# Patient Record
Sex: FEMALE | Race: BLACK OR AFRICAN AMERICAN | NOT HISPANIC OR LATINO
[De-identification: names, ages, dates, MRNs, and addresses within clinical notes are randomized per-mention and may not be internally consistent; named-entity substitution may affect disease eponyms.]

---

## 2017-05-04 ENCOUNTER — TRANSCRIPTION ENCOUNTER (OUTPATIENT)
Age: 42
End: 2017-05-04

## 2023-06-12 PROBLEM — Z00.00 ENCOUNTER FOR PREVENTIVE HEALTH EXAMINATION: Status: ACTIVE | Noted: 2023-06-12

## 2023-06-13 ENCOUNTER — APPOINTMENT (OUTPATIENT)
Dept: ORTHOPEDIC SURGERY | Facility: CLINIC | Age: 48
End: 2023-06-13
Payer: COMMERCIAL

## 2023-06-13 VITALS — WEIGHT: 170 LBS | BODY MASS INDEX: 30.12 KG/M2 | HEIGHT: 63 IN

## 2023-06-13 DIAGNOSIS — Z78.9 OTHER SPECIFIED HEALTH STATUS: ICD-10-CM

## 2023-06-13 PROCEDURE — 99204 OFFICE O/P NEW MOD 45 MIN: CPT
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
## 2023-06-13 NOTE — ASSESSMENT
[FreeTextEntry1] : 47 year F with left knee post trauamtic arthritis from tibila plateau fracture\par - physical therapy, NSAIDs\par - Return in 6 weeks for follow up

## 2023-06-13 NOTE — IMAGING
[de-identified] : LEFT KNEE EXAM\par Alignment: Valgus\par Effusion: None\par Atrophy: None                                                 \par Stable to Varus/valgus stress\par Posterior Drawer Test: negative\par Anterior Drawer Test: Negative\par Knee Extension/Flexion: 0 / 125\par \par Medial/lateral compartments\par Medial joint line: + Tenderness\par Lateral joint line: + Tenderness\par Marisa test: negative\par \par Patellofemoral joint\par Medial patellar facet: no tenderness\par Patellar grind: Negative\par \par Tendons:\par Pes Anserine: No tenderness\par Gerdy’s Tubercle/ IT Band: No tenderness\par Quadriceps Tendon: No Tenderness\par patellar tendon: no Tenderness\par Tibial tubercle: not tenderness\par Calf: no Tenderness\par \par Neurovascular exam\par Muscle strength: 5/5\par Sensation to light touch: intact\par Distal pulses: 2+\par \par IMAGING:\par 06/13/2023 Xrays of the Left Knee were taken demonstrating: mild narrowing at MJL. Evidence of healed tibial plateau fx.

## 2023-07-26 ENCOUNTER — APPOINTMENT (OUTPATIENT)
Dept: ORTHOPEDIC SURGERY | Facility: CLINIC | Age: 48
End: 2023-07-26
Payer: COMMERCIAL

## 2023-07-26 VITALS — WEIGHT: 170 LBS | HEIGHT: 63 IN | BODY MASS INDEX: 30.12 KG/M2

## 2023-07-26 PROCEDURE — 99214 OFFICE O/P EST MOD 30 MIN: CPT

## 2023-07-26 RX ORDER — CYCLOBENZAPRINE HYDROCHLORIDE 10 MG/1
10 TABLET, FILM COATED ORAL
Qty: 30 | Refills: 0 | Status: ACTIVE | COMMUNITY
Start: 2023-07-26 | End: 1900-01-01
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
## 2023-07-26 NOTE — IMAGING
[de-identified] : LEFT KNEE EXAM\par Alignment: Valgus\par Effusion: None\par Atrophy: None                                                 \par Stable to Varus/valgus stress\par Posterior Drawer Test: negative\par Anterior Drawer Test: Negative\par Knee Extension/Flexion: 0 / 125\par \par Medial/lateral compartments\par Medial joint line: + Tenderness\par Lateral joint line: + Tenderness\par Marisa test: negative\par \par Patellofemoral joint\par Medial patellar facet: no tenderness\par Patellar grind: Negative\par \par Tendons:\par Pes Anserine: No tenderness\par Gerdy’s Tubercle/ IT Band: No tenderness\par Quadriceps Tendon: No Tenderness\par patellar tendon: no Tenderness\par Tibial tubercle: not tenderness\par Calf: no Tenderness\par \par Neurovascular exam\par Muscle strength: 5/5\par Sensation to light touch: intact\par Distal pulses: 2+\par \par IMAGING:\par 06/13/2023 Xrays of the Left Knee were taken demonstrating: mild narrowing at MJL. Evidence of healed tibial plateau fx.

## 2023-07-26 NOTE — ASSESSMENT
[FreeTextEntry1] : 47 year F with left knee post trauamtic arthritis from tibila plateau fracture\par - physical therapy, NSAIDs\par - Return in 6 weeks for follow up \par \par 07/26/2023 meloxicam didn’t, naproxen has helped in the past\par naproxen + flexeril\par PRN fu

## 2023-09-27 ENCOUNTER — APPOINTMENT (OUTPATIENT)
Dept: ORTHOPEDIC SURGERY | Facility: CLINIC | Age: 48
End: 2023-09-27
Payer: COMMERCIAL

## 2023-09-27 PROCEDURE — 99214 OFFICE O/P EST MOD 30 MIN: CPT

## 2023-10-11 ENCOUNTER — APPOINTMENT (OUTPATIENT)
Dept: MRI IMAGING | Facility: CLINIC | Age: 48
End: 2023-10-11
Payer: COMMERCIAL

## 2023-10-11 PROCEDURE — 73721 MRI JNT OF LWR EXTRE W/O DYE: CPT | Mod: LT

## 2023-10-25 ENCOUNTER — APPOINTMENT (OUTPATIENT)
Dept: ORTHOPEDIC SURGERY | Facility: CLINIC | Age: 48
End: 2023-10-25
Payer: COMMERCIAL

## 2023-10-25 PROCEDURE — 99214 OFFICE O/P EST MOD 30 MIN: CPT

## 2023-11-06 ENCOUNTER — APPOINTMENT (OUTPATIENT)
Dept: ORTHOPEDIC SURGERY | Facility: CLINIC | Age: 48
End: 2023-11-06

## 2023-11-08 ENCOUNTER — APPOINTMENT (OUTPATIENT)
Dept: ORTHOPEDIC SURGERY | Facility: CLINIC | Age: 48
End: 2023-11-08

## 2023-11-30 ENCOUNTER — APPOINTMENT (OUTPATIENT)
Dept: ORTHOPEDIC SURGERY | Facility: CLINIC | Age: 48
End: 2023-11-30

## 2024-01-15 ENCOUNTER — APPOINTMENT (OUTPATIENT)
Dept: PAIN MANAGEMENT | Facility: CLINIC | Age: 49
End: 2024-01-15
Payer: COMMERCIAL

## 2024-01-15 VITALS — HEIGHT: 63 IN | WEIGHT: 170 LBS | BODY MASS INDEX: 30.12 KG/M2

## 2024-01-15 DIAGNOSIS — Z82.49 FAMILY HISTORY OF ISCHEMIC HEART DISEASE AND OTHER DISEASES OF THE CIRCULATORY SYSTEM: ICD-10-CM

## 2024-01-15 PROCEDURE — 99204 OFFICE O/P NEW MOD 45 MIN: CPT

## 2024-01-15 RX ORDER — NAPROXEN 500 MG/1
500 TABLET ORAL
Qty: 28 | Refills: 2 | Status: DISCONTINUED | COMMUNITY
Start: 2023-07-26 | End: 2024-01-15

## 2024-01-15 RX ORDER — DICLOFENAC SODIUM 3 G/100G
3 GEL TOPICAL TWICE DAILY
Qty: 1 | Refills: 0 | Status: ACTIVE | COMMUNITY
Start: 2024-01-15 | End: 1900-01-01

## 2024-01-15 RX ORDER — MELOXICAM 15 MG/1
15 TABLET ORAL
Qty: 30 | Refills: 0 | Status: DISCONTINUED | COMMUNITY
Start: 2023-06-13 | End: 2024-01-15

## 2024-01-15 RX ORDER — DICLOFENAC SODIUM 75 MG/1
75 TABLET, DELAYED RELEASE ORAL TWICE DAILY
Qty: 30 | Refills: 0 | Status: DISCONTINUED | COMMUNITY
Start: 2023-10-25 | End: 2024-01-15

## 2024-01-15 NOTE — DISCUSSION/SUMMARY
[de-identified] : JAMES YUEN is a 48 year-old woman presenting for a NPV for a history of chronic left knee pain.   Prior treatment: Diclofenac Meloxicam Naproxen Cyclobenzaprine Physical therapy  Plan: 1) MRI left knee reviewed. 2) Trial diclofenac 3% topical gel. Discussed the risks of NSAIDs, including worsening HTN, cardiovascular risks, GI risk, renal injury. The patient was told not to take other NSAIDs with this and to consult with their PCP if there is a significant medical history to warrant this prior to initiating treatment. 3) IF ineffective, may prescribe celecoxib 200mg BID prn 4) Trial gabapentin 300mg with increase to TID; Discussed AEs, including sedation, dizziness, somnolence, depression. Patient told to use caution when driving after taking the first few doses. 5) Consider repeat CSI of the LEFT knee 6) Continue home exercise regimen 7) RTC 6 weeks

## 2024-01-15 NOTE — DATA REVIEWED
[MRI] : MRI [Left] : left [Knee] : knee [Report was reviewed and noted in the chart] : The report was reviewed and noted in the chart [FreeTextEntry1] : 10/11/2023: osteochondral defect in femoral condyle mild chronic ACL sprain slight patellofemoral effusion

## 2024-01-15 NOTE — HISTORY OF PRESENT ILLNESS
[Left Leg] : left leg [Sudden] : sudden [8] : 8 [Burning] : burning [Sharp] : sharp [Constant] : constant [Household chores] : household chores [Leisure] : leisure [Rest] : rest [Standing] : standing [Walking] : walking [] : no [Full time] : Work status: full time [FreeTextEntry1] : Lt knee

## 2024-01-15 NOTE — PHYSICAL EXAM
[de-identified] : Gen: NAD HEENT: NC/AT, no scleral icterus; patient wears glasses CV: no JVD Resp: nonlabored breathing Abd: nondistended Ext: LLE: +TTP over the inferior aspect of the patella and lateral aspect of knee, full flexion/extension of the leg, minimal edema over the distal knee Neuro: CN intact Psych: normal affect

## 2024-01-31 ENCOUNTER — APPOINTMENT (OUTPATIENT)
Dept: ORTHOPEDIC SURGERY | Facility: CLINIC | Age: 49
End: 2024-01-31

## 2024-02-01 ENCOUNTER — APPOINTMENT (OUTPATIENT)
Dept: ORTHOPEDIC SURGERY | Facility: CLINIC | Age: 49
End: 2024-02-01

## 2024-02-06 ENCOUNTER — APPOINTMENT (OUTPATIENT)
Dept: ORTHOPEDIC SURGERY | Facility: CLINIC | Age: 49
End: 2024-02-06
Payer: COMMERCIAL

## 2024-02-06 VITALS — WEIGHT: 170 LBS | BODY MASS INDEX: 30.12 KG/M2 | HEIGHT: 63 IN

## 2024-02-06 PROCEDURE — 99215 OFFICE O/P EST HI 40 MIN: CPT

## 2024-02-06 RX ORDER — NAPROXEN 500 MG/1
500 TABLET ORAL
Qty: 60 | Refills: 0 | Status: ACTIVE | COMMUNITY
Start: 2024-02-06 | End: 1900-01-01

## 2024-02-06 RX ORDER — METHYLPREDNISOLONE 4 MG/1
4 TABLET ORAL
Qty: 1 | Refills: 1 | Status: ACTIVE | COMMUNITY
Start: 2024-02-06 | End: 1900-01-01

## 2024-02-06 NOTE — ASSESSMENT
[FreeTextEntry1] : 47 year F with left knee post trauamtic arthritis from tibila plateau fracture - physical therapy, NSAIDs - Return in 6 weeks for follow up   2023 meloxicam didn't, naproxen has helped in the past naproxen + flexeril PRN fu  2023 MRI of the left knee, failed PT, prior hx of tibial plateau fx naproxen and fu after completion continue PT fu with PM  24: discussed MRI in detail with patient OCD defect on the medial femora condyle  rec: fu with Dr. Ernst to determine if bone grafting is an option, if not, then will consider CSI vs PKA MDP and naproxen prescribed  Time Based billin minutes was spent with the patient today taking the patient's history, conducting a physical examination, reviewing imaging studies, and  detailed discussion regarding the diagnosis and treatment plan.

## 2024-02-06 NOTE — IMAGING
[de-identified] : LEFT KNEE EXAM Alignment: Valgus Effusion: None Atrophy: None                                                  Stable to Varus/valgus stress Posterior Drawer Test: negative Anterior Drawer Test: Negative Knee Extension/Flexion: 0 / 125  Medial/lateral compartments Medial joint line: + Tenderness Lateral joint line: + Tenderness Marisa test: negative  Patellofemoral joint Medial patellar facet: no tenderness Patellar grind: Negative  Tendons: Pes Anserine: No tenderness Gerdy's Tubercle/ IT Band: No tenderness Quadriceps Tendon: No Tenderness patellar tendon: no Tenderness Tibial tubercle: not tenderness Calf: no Tenderness  Neurovascular exam Muscle strength: 5/5 Sensation to light touch: intact Distal pulses: 2+  [id number] Xrays of the Left Knee were taken demonstrating: mild narrowing at MJL. Evidence of healed tibial plateau fx.
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
## 2024-02-08 PROBLEM — M17.12 ARTHRITIS OF KNEE, LEFT: Status: RESOLVED | Noted: 2023-06-13 | Resolved: 2024-02-08

## 2024-02-08 PROBLEM — M95.8 OSTEOCHONDRAL DEFECT OF CONDYLE OF FEMUR: Status: RESOLVED | Noted: 2023-10-25 | Resolved: 2024-02-08

## 2024-02-12 ENCOUNTER — APPOINTMENT (OUTPATIENT)
Dept: ORTHOPEDIC SURGERY | Facility: CLINIC | Age: 49
End: 2024-02-12

## 2024-02-12 DIAGNOSIS — M95.8 OTHER SPECIFIED ACQUIRED DEFORMITIES OF MUSCULOSKELETAL SYSTEM: ICD-10-CM

## 2024-02-12 DIAGNOSIS — M17.12 UNILATERAL PRIMARY OSTEOARTHRITIS, LEFT KNEE: ICD-10-CM

## 2024-02-15 NOTE — HISTORY OF PRESENT ILLNESS
[de-identified] : 48 year old female  (  )   chronic left knee pain for years, worsenign since May 2023 when twisted getting up from couch.  Has seen Dr. Bar and Carlos but cont to have symptoms The pain is located  medial and deep The pain is associated with  swelling, catching, buckling Worse with activity and better at rest. Has tried ice, nsaids, activity mod, PT, HEP

## 2024-02-15 NOTE — IMAGING
[de-identified] :  LEFT KNEE Inspection:  mild effusion Palpation: medial femoral condyle tenderness  Knee Range of Motion:  0-125  Strength: 5/5 Quadriceps strength, 5/5 Hamstring strength Neurological: light touch is intact throughout Ligament Stability and Special Tests:  McMurrays: Positive Lachman: neg Pivot Shift: neg Posterior Drawer: neg Valgus: neg Varus: neg Patella Apprehension: neg Patella Maltracking: neg

## 2024-02-15 NOTE — ASSESSMENT
[FreeTextEntry1] : notes from dr. kelly and neville reviewed Imaging was reviewed and independently interpreted xrays - no sig deg changes mri left knee 10/11/23 - focal chondral defect of the MFC 1.4acm AP X 1cm ML, eff, synv  - We discussed their diagnosis and treatment options at length including the risks and benefits of both surgical and non-surgical options. - Given their active lifestyle along with continued pain and mechanical symptoms despite conservative treatment they are a surgical candidate. - The risks, benefits, and alternatives to left knee open Agili-C implantation vs chondroplasty and cartilage biopsy (for poss subq ACI) were discussed with the patient, all questions were answered.

## 2024-02-26 ENCOUNTER — APPOINTMENT (OUTPATIENT)
Dept: PAIN MANAGEMENT | Facility: CLINIC | Age: 49
End: 2024-02-26

## 2024-02-26 NOTE — HISTORY OF PRESENT ILLNESS
[Left Leg] : left leg [Sudden] : sudden [8] : 8 [Burning] : burning [Sharp] : sharp [Constant] : constant [Household chores] : household chores [Leisure] : leisure [Rest] : rest [Standing] : standing [Walking] : walking [Full time] : Work status: full time [] : no [FreeTextEntry1] : Lt knee

## 2024-02-26 NOTE — PHYSICAL EXAM
[de-identified] : Gen: NAD HEENT: NC/AT, no scleral icterus; patient wears glasses CV: no JVD Resp: nonlabored breathing Abd: nondistended Ext: LLE: +TTP over the inferior aspect of the patella and lateral aspect of knee, full flexion/extension of the leg, minimal edema over the distal knee Neuro: CN intact Psych: normal affect

## 2024-02-26 NOTE — DISCUSSION/SUMMARY
[de-identified] : JAMES YUEN is a 48 year-old woman presenting for a NPV for a history of chronic left knee pain.   Prior treatment: Diclofenac Meloxicam Naproxen Cyclobenzaprine Physical therapy  Plan: 1) MRI left knee reviewed. 2) Trial diclofenac 3% topical gel. Discussed the risks of NSAIDs, including worsening HTN, cardiovascular risks, GI risk, renal injury. The patient was told not to take other NSAIDs with this and to consult with their PCP if there is a significant medical history to warrant this prior to initiating treatment. 3) IF ineffective, may prescribe celecoxib 200mg BID prn 4) Trial gabapentin 300mg with increase to TID; Discussed AEs, including sedation, dizziness, somnolence, depression. Patient told to use caution when driving after taking the first few doses. 5) Consider repeat CSI of the LEFT knee 6) Continue home exercise regimen 7) RTC 6 weeks

## 2024-03-27 ENCOUNTER — RX RENEWAL (OUTPATIENT)
Age: 49
End: 2024-03-27

## 2024-03-27 RX ORDER — GABAPENTIN 300 MG/1
300 CAPSULE ORAL
Qty: 90 | Refills: 0 | Status: ACTIVE | COMMUNITY
Start: 2024-01-15 | End: 1900-01-01

## 2024-04-08 ENCOUNTER — APPOINTMENT (OUTPATIENT)
Dept: PAIN MANAGEMENT | Facility: CLINIC | Age: 49
End: 2024-04-08
Payer: COMMERCIAL

## 2024-04-08 VITALS — HEIGHT: 63 IN | BODY MASS INDEX: 33.49 KG/M2 | WEIGHT: 189 LBS

## 2024-04-08 DIAGNOSIS — G47.00 INSOMNIA, UNSPECIFIED: ICD-10-CM

## 2024-04-08 PROCEDURE — 99214 OFFICE O/P EST MOD 30 MIN: CPT

## 2024-04-08 RX ORDER — GABAPENTIN 600 MG/1
600 TABLET, COATED ORAL 3 TIMES DAILY
Qty: 90 | Refills: 1 | Status: ACTIVE | COMMUNITY
Start: 2024-04-08 | End: 1900-01-01

## 2024-04-08 RX ORDER — TRAZODONE HYDROCHLORIDE 50 MG/1
50 TABLET ORAL
Qty: 30 | Refills: 1 | Status: ACTIVE | COMMUNITY
Start: 2024-04-08 | End: 1900-01-01

## 2024-04-08 NOTE — HISTORY OF PRESENT ILLNESS
[Left Leg] : left leg [Sudden] : sudden [8] : 8 [Burning] : burning [Sharp] : sharp [Constant] : constant [Household chores] : household chores [Leisure] : leisure [Rest] : rest [Standing] : standing [Walking] : walking [Full time] : Work status: full time [5] : 5 [Dull/Aching] : dull/aching [Throbbing] : throbbing [Sleep] : sleep [Ice] : ice [Heat] : heat [Physical therapy] : physical therapy [Nothing helps with pain getting better] : Nothing helps with pain getting better [Stairs] : stairs [] : no [FreeTextEntry1] : Lt knee [de-identified] : twisting  [de-identified] : x-ray and MRI

## 2024-04-08 NOTE — DISCUSSION/SUMMARY
[de-identified] : JAMES YUEN is a 48 year-old woman presenting for a RPV for a history of chronic left knee pain.   Prior treatment: Diclofenac Meloxicam Celecoxib Naproxen Cyclobenzaprine Physical therapy  Plan: 1) MRI left knee report reviewed with the patient. 2) Continue diclofenac 1% topical gel.  3) Increase gabapentin to 600mg TID; Discussed AEs, including sedation, dizziness, somnolence, depression. Patient told to use caution when driving after taking the first few doses. 4) Trial trazodone 50mg qhs; Discussed AEs, including sedation, dizziness, somnolence. Patient told to use caution when driving after taking the first few doses. 5) Consider repeat CSI of the LEFT knee 6) Continue home exercise regimen 7) Discussed with the patient that we do not recommend chronic opioid therapy for her chronic knee pain 8) RTC 6 weeks

## 2024-04-08 NOTE — PHYSICAL EXAM
[de-identified] : Gen: NAD HEENT: NC/AT, no scleral icterus; patient wears glasses CV: no JVD Resp: nonlabored breathing Abd: nondistended Ext: LLE: +TTP over the inferior aspect of the patella and lateral aspect of knee, full flexion/extension of the leg, minimal edema over the distal knee Neuro: CN intact Psych: normal affect

## 2024-05-16 PROBLEM — M65.9 SYNOVITIS OF KNEE: Status: ACTIVE | Noted: 2024-02-08

## 2024-05-16 PROBLEM — M25.562 CHRONIC PAIN OF LEFT KNEE: Status: ACTIVE | Noted: 2024-01-15

## 2024-05-16 PROBLEM — M23.8X2 CHONDRAL DEFECT OF CONDYLE OF LEFT FEMUR: Status: ACTIVE | Noted: 2024-02-08

## 2024-05-20 ENCOUNTER — APPOINTMENT (OUTPATIENT)
Dept: PAIN MANAGEMENT | Facility: CLINIC | Age: 49
End: 2024-05-20

## 2024-05-20 DIAGNOSIS — M65.9 SYNOVITIS AND TENOSYNOVITIS, UNSPECIFIED: ICD-10-CM

## 2024-05-20 DIAGNOSIS — G89.29 PAIN IN LEFT KNEE: ICD-10-CM

## 2024-05-20 DIAGNOSIS — M25.562 PAIN IN LEFT KNEE: ICD-10-CM

## 2024-05-20 DIAGNOSIS — M23.8X2 OTHER INTERNAL DERANGEMENTS OF LEFT KNEE: ICD-10-CM

## 2024-05-21 NOTE — PHYSICAL EXAM
[de-identified] : Gen: NAD HEENT: NC/AT, no scleral icterus; patient wears glasses CV: no JVD Resp: nonlabored breathing Abd: nondistended Ext: LLE: +TTP over the inferior aspect of the patella and lateral aspect of knee, full flexion/extension of the leg, minimal edema over the distal knee Neuro: CN intact Psych: normal affect

## 2024-05-21 NOTE — HISTORY OF PRESENT ILLNESS
[Left Leg] : left leg [Sudden] : sudden [5] : 5 [8] : 8 [Dull/Aching] : dull/aching [Sharp] : sharp [Throbbing] : throbbing [Constant] : constant [Household chores] : household chores [Leisure] : leisure [Sleep] : sleep [Rest] : rest [Ice] : ice [Heat] : heat [Physical therapy] : physical therapy [Nothing helps with pain getting better] : Nothing helps with pain getting better [Standing] : standing [Walking] : walking [Stairs] : stairs [Full time] : Work status: full time [] : no [FreeTextEntry1] : Lt knee [de-identified] : twisting  [de-identified] : x-ray and MRI

## 2024-05-21 NOTE — DISCUSSION/SUMMARY
[de-identified] : JAMES YUEN is a 48 year-old woman presenting for a RPV for a history of chronic left knee pain.   Prior treatment: Diclofenac Meloxicam Celecoxib Naproxen Cyclobenzaprine Physical therapy  Plan: 1) MRI left knee report reviewed with the patient. 2) Continue diclofenac 1% topical gel.  3) Increase gabapentin to 600mg TID; Discussed AEs, including sedation, dizziness, somnolence, depression. Patient told to use caution when driving after taking the first few doses. 4) Trial trazodone 50mg qhs; Discussed AEs, including sedation, dizziness, somnolence. Patient told to use caution when driving after taking the first few doses. 5) Consider repeat CSI of the LEFT knee 6) Continue home exercise regimen 7) Discussed with the patient that we do not recommend chronic opioid therapy for her chronic knee pain 8) RTC 6 weeks

## 2024-07-29 ENCOUNTER — APPOINTMENT (OUTPATIENT)
Dept: PAIN MANAGEMENT | Facility: CLINIC | Age: 49
End: 2024-07-29
Payer: COMMERCIAL

## 2024-07-29 VITALS — HEIGHT: 63 IN | WEIGHT: 186 LBS | BODY MASS INDEX: 32.96 KG/M2

## 2024-07-29 DIAGNOSIS — M25.562 PAIN IN LEFT KNEE: ICD-10-CM

## 2024-07-29 DIAGNOSIS — M65.9 SYNOVITIS AND TENOSYNOVITIS, UNSPECIFIED: ICD-10-CM

## 2024-07-29 DIAGNOSIS — G47.00 INSOMNIA, UNSPECIFIED: ICD-10-CM

## 2024-07-29 DIAGNOSIS — M54.50 LOW BACK PAIN, UNSPECIFIED: ICD-10-CM

## 2024-07-29 DIAGNOSIS — G89.29 PAIN IN LEFT KNEE: ICD-10-CM

## 2024-07-29 DIAGNOSIS — G89.29 LOW BACK PAIN, UNSPECIFIED: ICD-10-CM

## 2024-07-29 PROCEDURE — 99214 OFFICE O/P EST MOD 30 MIN: CPT

## 2024-07-29 RX ORDER — ZOLPIDEM TARTRATE 10 MG/1
10 TABLET ORAL
Qty: 30 | Refills: 0 | Status: ACTIVE | COMMUNITY
Start: 2024-07-29 | End: 1900-01-01

## 2024-07-29 NOTE — HISTORY OF PRESENT ILLNESS
[Left Leg] : left leg [Sudden] : sudden [5] : 5 [8] : 8 [Dull/Aching] : dull/aching [Sharp] : sharp [Throbbing] : throbbing [Constant] : constant [Household chores] : household chores [Leisure] : leisure [Sleep] : sleep [Rest] : rest [Ice] : ice [Heat] : heat [Physical therapy] : physical therapy [Nothing helps with pain getting better] : Nothing helps with pain getting better [Standing] : standing [Walking] : walking [Stairs] : stairs [Full time] : Work status: full time [7] : 7 [] : no [FreeTextEntry1] : Lt knee [de-identified] : twisting, cold weather [de-identified] : x-ray and MRI

## 2024-07-29 NOTE — HISTORY OF PRESENT ILLNESS
[Left Leg] : left leg [Sudden] : sudden [5] : 5 [8] : 8 [Dull/Aching] : dull/aching [Sharp] : sharp [Throbbing] : throbbing [Constant] : constant [Household chores] : household chores [Leisure] : leisure [Sleep] : sleep [Rest] : rest [Ice] : ice [Heat] : heat [Physical therapy] : physical therapy [Nothing helps with pain getting better] : Nothing helps with pain getting better [Standing] : standing [Walking] : walking [Stairs] : stairs [Full time] : Work status: full time [7] : 7 [] : no [FreeTextEntry1] : Lt knee [de-identified] : twisting, cold weather [de-identified] : x-ray and MRI

## 2024-07-29 NOTE — DISCUSSION/SUMMARY
[de-identified] : JAMES YUEN is a 48 year-old woman presenting for a RPV for a history of chronic left knee pain.   Prior treatment: Diclofenac Meloxicam Celecoxib Naproxen Cyclobenzaprine Trazodone Physical therapy  Plan: 1) MRI left knee report reviewed with the patient. 2) Continue diclofenac 1% topical gel.  3) Refill gabapentin to 600mg TID; denies AEs 4) ONE TIME prescription for zolpidem 10mg #30 tabs 5) Consider repeat CSI of the LEFT knee 6) Continue home exercise regimen 7) Discussed with the patient that we do not recommend chronic opioid therapy for her chronic knee pain 8) RTC prn

## 2024-07-29 NOTE — PHYSICAL EXAM
[de-identified] : Gen: NAD HEENT: NC/AT, no scleral icterus; patient wears glasses CV: no JVD Resp: nonlabored breathing Abd: nondistended Ext: LLE: sleeve over the knee; +TTP over the inferior aspect of the patella and lateral aspect of knee, full flexion/extension of the leg, no edema Neuro: CN intact Psych: normal affect

## 2024-07-29 NOTE — PHYSICAL EXAM
[de-identified] : Gen: NAD HEENT: NC/AT, no scleral icterus; patient wears glasses CV: no JVD Resp: nonlabored breathing Abd: nondistended Ext: LLE: sleeve over the knee; +TTP over the inferior aspect of the patella and lateral aspect of knee, full flexion/extension of the leg, no edema Neuro: CN intact Psych: normal affect

## 2024-07-29 NOTE — DISCUSSION/SUMMARY
[de-identified] : JAMES UYEN is a 48 year-old woman presenting for a RPV for a history of chronic left knee pain.   Prior treatment: Diclofenac Meloxicam Celecoxib Naproxen Cyclobenzaprine Trazodone Physical therapy  Plan: 1) MRI left knee report reviewed with the patient. 2) Continue diclofenac 1% topical gel.  3) Refill gabapentin to 600mg TID; denies AEs 4) ONE TIME prescription for zolpidem 10mg #30 tabs 5) Consider repeat CSI of the LEFT knee 6) Continue home exercise regimen 7) Discussed with the patient that we do not recommend chronic opioid therapy for her chronic knee pain 8) RTC prn

## 2024-12-12 ENCOUNTER — APPOINTMENT (OUTPATIENT)
Dept: ORTHOPEDIC SURGERY | Facility: CLINIC | Age: 49
End: 2024-12-12
Payer: COMMERCIAL

## 2024-12-12 DIAGNOSIS — M54.50 LOW BACK PAIN, UNSPECIFIED: ICD-10-CM

## 2024-12-12 DIAGNOSIS — G89.29 LOW BACK PAIN, UNSPECIFIED: ICD-10-CM

## 2024-12-12 DIAGNOSIS — M50.30 OTHER CERVICAL DISC DEGENERATION, UNSPECIFIED CERVICAL REGION: ICD-10-CM

## 2024-12-12 DIAGNOSIS — M51.360: ICD-10-CM

## 2024-12-12 DIAGNOSIS — M62.830 MUSCLE SPASM OF BACK: ICD-10-CM

## 2024-12-12 PROCEDURE — 72070 X-RAY EXAM THORAC SPINE 2VWS: CPT

## 2024-12-12 PROCEDURE — 99214 OFFICE O/P EST MOD 30 MIN: CPT

## 2024-12-12 PROCEDURE — 72050 X-RAY EXAM NECK SPINE 4/5VWS: CPT

## 2024-12-12 PROCEDURE — 72110 X-RAY EXAM L-2 SPINE 4/>VWS: CPT

## 2024-12-12 RX ORDER — METHOCARBAMOL 750 MG/1
750 TABLET, FILM COATED ORAL
Qty: 30 | Refills: 0 | Status: ACTIVE | COMMUNITY
Start: 2024-12-12 | End: 1900-01-01

## 2024-12-12 RX ORDER — METHYLPREDNISOLONE 4 MG/1
4 TABLET ORAL
Qty: 1 | Refills: 0 | Status: ACTIVE | COMMUNITY
Start: 2024-12-12 | End: 1900-01-01

## 2024-12-12 RX ORDER — GABAPENTIN 600 MG/1
600 TABLET, COATED ORAL
Qty: 90 | Refills: 0 | Status: ACTIVE | COMMUNITY
Start: 2024-12-12 | End: 1900-01-01

## 2025-02-06 ENCOUNTER — APPOINTMENT (OUTPATIENT)
Dept: ORTHOPEDIC SURGERY | Facility: CLINIC | Age: 50
End: 2025-02-06

## 2025-03-06 ENCOUNTER — APPOINTMENT (OUTPATIENT)
Dept: ORTHOPEDIC SURGERY | Facility: CLINIC | Age: 50
End: 2025-03-06
Payer: COMMERCIAL

## 2025-03-06 DIAGNOSIS — M51.34 OTHER INTERVERTEBRAL DISC DEGENERATION, THORACIC REGION: ICD-10-CM

## 2025-03-06 DIAGNOSIS — M54.6 PAIN IN THORACIC SPINE: ICD-10-CM

## 2025-03-06 PROCEDURE — 99214 OFFICE O/P EST MOD 30 MIN: CPT

## 2025-03-06 RX ORDER — MELOXICAM 15 MG/1
15 TABLET ORAL DAILY
Qty: 20 | Refills: 0 | Status: ACTIVE | COMMUNITY
Start: 2025-03-06 | End: 1900-01-01

## 2025-03-19 ENCOUNTER — APPOINTMENT (OUTPATIENT)
Dept: MRI IMAGING | Facility: CLINIC | Age: 50
End: 2025-03-19

## 2025-03-20 ENCOUNTER — APPOINTMENT (OUTPATIENT)
Dept: PAIN MANAGEMENT | Facility: CLINIC | Age: 50
End: 2025-03-20

## 2025-04-10 ENCOUNTER — APPOINTMENT (OUTPATIENT)
Dept: ORTHOPEDIC SURGERY | Facility: CLINIC | Age: 50
End: 2025-04-10

## 2025-05-12 ENCOUNTER — APPOINTMENT (OUTPATIENT)
Dept: MRI IMAGING | Facility: CLINIC | Age: 50
End: 2025-05-12

## 2025-07-28 ENCOUNTER — APPOINTMENT (OUTPATIENT)
Dept: PAIN MANAGEMENT | Facility: CLINIC | Age: 50
End: 2025-07-28

## 2025-08-20 PROBLEM — M65.969 SYNOVITIS OF KNEE: Status: ACTIVE | Noted: 2024-02-08

## 2025-08-20 PROBLEM — M17.12 ARTHRITIS OF KNEE, LEFT: Status: ACTIVE | Noted: 2023-06-13

## 2025-08-21 ENCOUNTER — APPOINTMENT (OUTPATIENT)
Dept: PAIN MANAGEMENT | Facility: CLINIC | Age: 50
End: 2025-08-21
Payer: COMMERCIAL

## 2025-08-21 VITALS — WEIGHT: 180 LBS | BODY MASS INDEX: 31.89 KG/M2 | HEIGHT: 63 IN

## 2025-08-21 DIAGNOSIS — M25.562 PAIN IN LEFT KNEE: ICD-10-CM

## 2025-08-21 DIAGNOSIS — M17.12 UNILATERAL PRIMARY OSTEOARTHRITIS, LEFT KNEE: ICD-10-CM

## 2025-08-21 DIAGNOSIS — M51.360: ICD-10-CM

## 2025-08-21 DIAGNOSIS — G89.29 PAIN IN LEFT KNEE: ICD-10-CM

## 2025-08-21 DIAGNOSIS — M65.969 UNSP SYNOVITIS AND TENOSYNOVITIS, UNSPECIFIED LOWER LEG: ICD-10-CM

## 2025-08-21 PROCEDURE — 99214 OFFICE O/P EST MOD 30 MIN: CPT

## 2025-09-11 ENCOUNTER — APPOINTMENT (OUTPATIENT)
Dept: PAIN MANAGEMENT | Facility: CLINIC | Age: 50
End: 2025-09-11

## 2025-09-12 ENCOUNTER — APPOINTMENT (OUTPATIENT)
Dept: MRI IMAGING | Facility: CLINIC | Age: 50
End: 2025-09-12